# Patient Record
(demographics unavailable — no encounter records)

---

## 2025-03-13 NOTE — PHYSICAL EXAM
[Normal] : no acute distress, well nourished, well developed and well-appearing [No Edema] : there was no peripheral edema [No Rash] : no rash [Normal Gait] : normal gait [Normal Affect] : the affect was normal [Normal Insight/Judgement] : insight and judgment were intact [de-identified] : +yellow, thickened toe nails

## 2025-03-13 NOTE — HISTORY OF PRESENT ILLNESS
[FreeTextEntry8] : 30yo F presents with right toe nail fungus on 3/5 toes for past few months. pt reports it started on her right pinky toe first. Pt also reports never had her CPE bloodwork completed.

## 2025-03-13 NOTE — PLAN
[FreeTextEntry1] : BW ordered for CPE  repeat CMP in 4 weeks after starting terbinafine education on toenail fungus follow up podiatry if no improvement in toes